# Patient Record
Sex: FEMALE | Race: WHITE | NOT HISPANIC OR LATINO | Employment: FULL TIME | URBAN - NONMETROPOLITAN AREA
[De-identification: names, ages, dates, MRNs, and addresses within clinical notes are randomized per-mention and may not be internally consistent; named-entity substitution may affect disease eponyms.]

---

## 2018-09-10 ENCOUNTER — HISTORICAL (OUTPATIENT)
Dept: ADMINISTRATIVE | Facility: HOSPITAL | Age: 58
End: 2018-09-10

## 2019-03-11 ENCOUNTER — HISTORICAL (OUTPATIENT)
Dept: ADMINISTRATIVE | Facility: HOSPITAL | Age: 59
End: 2019-03-11

## 2019-10-18 ENCOUNTER — HISTORICAL (OUTPATIENT)
Dept: ADMINISTRATIVE | Facility: HOSPITAL | Age: 59
End: 2019-10-18

## 2021-02-04 ENCOUNTER — HISTORICAL (OUTPATIENT)
Dept: RADIOLOGY | Facility: HOSPITAL | Age: 61
End: 2021-02-04

## 2022-04-10 ENCOUNTER — HISTORICAL (OUTPATIENT)
Dept: ADMINISTRATIVE | Facility: HOSPITAL | Age: 62
End: 2022-04-10

## 2022-04-28 VITALS
BODY MASS INDEX: 21.94 KG/M2 | HEIGHT: 58 IN | DIASTOLIC BLOOD PRESSURE: 68 MMHG | WEIGHT: 104.5 LBS | SYSTOLIC BLOOD PRESSURE: 110 MMHG

## 2022-08-23 ENCOUNTER — HISTORICAL (OUTPATIENT)
Dept: ADMINISTRATIVE | Facility: HOSPITAL | Age: 62
End: 2022-08-23

## 2023-03-30 ENCOUNTER — TELEPHONE (OUTPATIENT)
Dept: GASTROENTEROLOGY | Facility: CLINIC | Age: 63
End: 2023-03-30
Payer: MEDICAID

## 2023-03-30 NOTE — TELEPHONE ENCOUNTER
----- Message from Rmeedios Overton LPN sent at 3/30/2023 12:19 PM CDT -----  Regarding: FW: Colonosscopy  Contact: patient    ----- Message -----  From: Ellen Mcclelland  Sent: 3/30/2023  10:44 AM CDT  To: [facility] Staff  Subject: Colonosscopy                                     Per phone call with patient, she stated that she wants to schedule and appointment for a colonoscopy to be done.  Please return call at [phone] (home).    Thanks,  SJ

## 2023-04-04 ENCOUNTER — TELEPHONE (OUTPATIENT)
Dept: GASTROENTEROLOGY | Facility: CLINIC | Age: 63
End: 2023-04-04
Payer: MEDICAID

## 2023-04-04 NOTE — TELEPHONE ENCOUNTER
----- Message from Yamini Zepeda LPN sent at 4/3/2023  3:37 PM CDT -----  Contact: Chela    ----- Message -----  From: Rhiannon Bartlett  Sent: 4/3/2023   2:08 PM CDT  To: Josué [facility] Staff    Type:  Patient Returning Call    Who Called: Chela  Who Left Message for Patient: Lexii Meier  Does the patient know what this is regarding?: Appointment   Would the patient rather a call back or a response via [facility]? Call back   Best Call Back Number: please call her at [phone]  Additional Information:

## 2023-04-18 ENCOUNTER — OFFICE VISIT (OUTPATIENT)
Dept: GASTROENTEROLOGY | Facility: CLINIC | Age: 63
End: 2023-04-18
Payer: MEDICAID

## 2023-04-18 VITALS
SYSTOLIC BLOOD PRESSURE: 124 MMHG | HEART RATE: 73 BPM | WEIGHT: 107.19 LBS | BODY MASS INDEX: 22.5 KG/M2 | DIASTOLIC BLOOD PRESSURE: 80 MMHG | OXYGEN SATURATION: 97 % | HEIGHT: 58 IN

## 2023-04-18 DIAGNOSIS — Z12.11 SCREENING FOR COLON CANCER: ICD-10-CM

## 2023-04-18 DIAGNOSIS — K64.9 HEMORRHOIDS, UNSPECIFIED HEMORRHOID TYPE: Primary | ICD-10-CM

## 2023-04-18 PROCEDURE — 3079F DIAST BP 80-89 MM HG: CPT | Mod: CPTII,S$GLB,,

## 2023-04-18 PROCEDURE — 1159F MED LIST DOCD IN RCRD: CPT | Mod: CPTII,S$GLB,,

## 2023-04-18 PROCEDURE — 3074F SYST BP LT 130 MM HG: CPT | Mod: CPTII,S$GLB,,

## 2023-04-18 PROCEDURE — 99202 OFFICE O/P NEW SF 15 MIN: CPT | Mod: S$GLB,,,

## 2023-04-18 PROCEDURE — 3079F PR MOST RECENT DIASTOLIC BLOOD PRESSURE 80-89 MM HG: ICD-10-PCS | Mod: CPTII,S$GLB,,

## 2023-04-18 PROCEDURE — 1160F RVW MEDS BY RX/DR IN RCRD: CPT | Mod: CPTII,S$GLB,,

## 2023-04-18 PROCEDURE — 99202 PR OFFICE/OUTPT VISIT, NEW, LEVL II, 15-29 MIN: ICD-10-PCS | Mod: S$GLB,,,

## 2023-04-18 PROCEDURE — 3008F PR BODY MASS INDEX (BMI) DOCUMENTED: ICD-10-PCS | Mod: CPTII,S$GLB,,

## 2023-04-18 PROCEDURE — 3074F PR MOST RECENT SYSTOLIC BLOOD PRESSURE < 130 MM HG: ICD-10-PCS | Mod: CPTII,S$GLB,,

## 2023-04-18 PROCEDURE — 1159F PR MEDICATION LIST DOCUMENTED IN MEDICAL RECORD: ICD-10-PCS | Mod: CPTII,S$GLB,,

## 2023-04-18 PROCEDURE — 3008F BODY MASS INDEX DOCD: CPT | Mod: CPTII,S$GLB,,

## 2023-04-18 PROCEDURE — 1160F PR REVIEW ALL MEDS BY PRESCRIBER/CLIN PHARMACIST DOCUMENTED: ICD-10-PCS | Mod: CPTII,S$GLB,,

## 2023-04-18 RX ORDER — CONJUGATED ESTROGENS 0.62 MG/G
CREAM VAGINAL
COMMUNITY
Start: 2023-02-02 | End: 2023-05-26 | Stop reason: SDUPTHER

## 2023-04-18 RX ORDER — SOD SULF/POT CHLORIDE/MAG SULF 1.479 G
12 TABLET ORAL DAILY
Qty: 24 TABLET | Refills: 0 | Status: SHIPPED | OUTPATIENT
Start: 2023-04-18 | End: 2023-09-11

## 2023-04-18 RX ORDER — ESTRADIOL 0.5 MG/1
0.5 TABLET ORAL
COMMUNITY
Start: 2023-03-27 | End: 2023-09-11

## 2023-04-18 RX ORDER — VALACYCLOVIR HYDROCHLORIDE 1 G/1
TABLET, FILM COATED ORAL
COMMUNITY

## 2023-04-18 RX ORDER — BETAMETHASONE DIPROPIONATE 0.5 MG/G
CREAM TOPICAL
COMMUNITY

## 2023-04-18 RX ORDER — CALCIUM CARBONATE 600 MG
TABLET ORAL
COMMUNITY

## 2023-04-18 NOTE — PROGRESS NOTES
Clinic Note    Reason for visit:  The primary encounter diagnosis was Hemorrhoids, unspecified hemorrhoid type. A diagnosis of Screening for colon cancer was also pertinent to this visit.    PCP: Hardeep Lozano Iii       HPI:  This is a 62 y.o. female who is here to establish care. Patient states she has seen Dr. Moses in the past and is due for a colonoscopy. Can't recall if had polyps. Patient denies any reflux, dysphagia, abdominal pain, constipation, diarrhea, or blood in stool. She states sometimes after a BM she has to wipe a lot. She takes Metamucil and doesn't have this issue.       Review of Systems   Constitutional:  Negative for chills, diaphoresis, fatigue, fever and unexpected weight change.   HENT:  Negative for mouth sores, nosebleeds, postnasal drip, sore throat, trouble swallowing and voice change.    Eyes:  Negative for pain, discharge and eye dryness.   Respiratory:  Negative for apnea, cough, choking, chest tightness, shortness of breath and wheezing.    Cardiovascular:  Negative for chest pain, palpitations, leg swelling and claudication.   Gastrointestinal:  Negative for abdominal distention, abdominal pain, anal bleeding, blood in stool, change in bowel habit, constipation, diarrhea, nausea, rectal pain, vomiting, reflux, fecal incontinence and change in bowel habit.   Genitourinary:  Negative for bladder incontinence, difficulty urinating, dysuria, flank pain, frequency and hematuria.   Musculoskeletal:  Negative for arthralgias, back pain, joint swelling and joint deformity.   Integumentary:  Negative for color change, rash and wound.   Allergic/Immunologic: Negative for environmental allergies and food allergies.   Neurological:  Negative for seizures, facial asymmetry, speech difficulty, weakness, headaches and memory loss.   Hematological:  Negative for adenopathy. Does not bruise/bleed easily.   Psychiatric/Behavioral:  Negative for agitation, behavioral problems, confusion,  "hallucinations and sleep disturbance.       Past Medical History:   Diagnosis Date    Hormone replacement therapy (HRT)      Past Surgical History:   Procedure Laterality Date     SECTION      x2    COLONOSCOPY      TOTAL ABDOMINAL HYSTERECTOMY      per [facility] provider history     Family History   Problem Relation Age of Onset    Diverticulitis Mother     Diverticulitis Father      Social History     Tobacco Use    Smoking status: Never    Smokeless tobacco: Never   Substance Use Topics    Alcohol use: Yes     Comment: social    Drug use: Never     Review of patient's allergies indicates:   Allergen Reactions    Meloxicam       Medication List with Changes/Refills   New Medications    SOD SULF-POT CHLORIDE-MAG SULF (SUTAB) 1.479-0.188- 0.225 GRAM TABLET    Take 12 tablets by mouth once daily. Take according to package instructions with indicated amount of water. No breakfast day before test. May substitute with Suprep, Clenpiq, Plenvu, Moviprep or GoLytely based on Rx plan and patient preference.   Current Medications    BETAMETHASONE DIPROPIONATE 0.05 % CREAM    betamethasone dipropionate 0.05 % topical cream    CALCIUM CARBONATE (OS-JESSIE) 600 MG CALCIUM (1,500 MG) TAB    calcium carbonate 600 mg calcium (1,500 mg) tablet    ESTRADIOL (ESTRACE) 0.5 MG TABLET    Take 0.5 mg by mouth.    MULTIVITAMIN WITH MINERALS TABLET    Complete Multivitamin tablet   Take 1 tablet every day by oral route.    PREMARIN VAGINAL CREAM    Insert 0.5 grams of cream in vagina once daily at bedtime for 14 days then twice weekly thereafter.    VALACYCLOVIR (VALTREX) 1000 MG TABLET    valacyclovir 1 gram tablet         Vital Signs:  /80   Pulse 73   Ht 4' 10" (1.473 m)   Wt 48.6 kg (107 lb 3.2 oz)   SpO2 97%   BMI 22.40 kg/m²        Physical Exam  Vitals reviewed.   Constitutional:       General: She is awake. She is not in acute distress.     Appearance: Normal appearance. She is well-developed. She is not " ill-appearing, toxic-appearing or diaphoretic.   HENT:      Head: Normocephalic and atraumatic.      Nose: Nose normal.      Mouth/Throat:      Mouth: Mucous membranes are moist.      Pharynx: Oropharynx is clear. No oropharyngeal exudate or posterior oropharyngeal erythema.   Eyes:      General: Lids are normal. Gaze aligned appropriately. No scleral icterus.        Right eye: No discharge.         Left eye: No discharge.      Extraocular Movements: Extraocular movements intact.      Conjunctiva/sclera: Conjunctivae normal.   Neck:      Trachea: Trachea normal.   Cardiovascular:      Rate and Rhythm: Normal rate and regular rhythm.      Pulses:           Radial pulses are 2+ on the right side and 2+ on the left side.   Pulmonary:      Effort: Pulmonary effort is normal. No respiratory distress.      Breath sounds: Normal breath sounds. No stridor. No wheezing or rhonchi.   Chest:      Chest wall: No tenderness.   Abdominal:      General: Bowel sounds are normal. There is no distension.      Palpations: Abdomen is soft. There is no fluid wave, hepatomegaly or mass.      Tenderness: There is no abdominal tenderness. There is no guarding or rebound.   Musculoskeletal:         General: No tenderness or deformity.      Cervical back: Full passive range of motion without pain and neck supple. No tenderness.      Right lower leg: No edema.      Left lower leg: No edema.   Lymphadenopathy:      Cervical: No cervical adenopathy.   Skin:     General: Skin is warm and dry.      Capillary Refill: Capillary refill takes less than 2 seconds.      Coloration: Skin is not cyanotic, jaundiced or pale.      Findings: No rash.   Neurological:      General: No focal deficit present.      Mental Status: She is alert and oriented to person, place, and time.      Cranial Nerves: No facial asymmetry.      Motor: No tremor.   Psychiatric:         Attention and Perception: Attention normal.         Mood and Affect: Mood and affect normal.          Speech: Speech normal.         Behavior: Behavior normal. Behavior is cooperative.          All of the data above and below has been reviewed by myself and any further interpretations will be reflected in the assessment and plan.   The data includes review of external notes, and independent interpretation of lab results, procedures, x-rays, and imaging reports.      Assessment:  Hemorrhoids, unspecified hemorrhoid type    Screening for colon cancer  -     Ambulatory Referral to External Surgery  -     sod sulf-pot chloride-mag sulf (SUTAB) 1.479-0.188- 0.225 gram tablet; Take 12 tablets by mouth once daily. Take according to package instructions with indicated amount of water. No breakfast day before test. May substitute with Suprep, Clenpiq, Plenvu, Moviprep or GoLytely based on Rx plan and patient preference.  Dispense: 24 tablet; Refill: 0         Recommendations:  Schedule colonoscopy with Dr. Moses at [facility] with sutab.    Risks, benefits, and alternatives of medical management, any associated procedures, and/or treatment discussed with the patient. Patient given opportunity to ask questions and voices understanding. Patient has elected to proceed with the recommended care modalities as discussed.    Follow up if symptoms worsen or fail to improve.    Order summary:  Orders Placed This Encounter   Procedures    Ambulatory Referral to External Surgery        Instructed patient to notify my office if they have not been contacted within two weeks after any procedures, submitting any samples (biopsies, blood, stool, urine, etc.) or after any imaging (X-ray, CT, MRI, etc.).      Shayna Kurtz NP    This document may have been created using a voice recognition transcribing system. Incorrect words or phrases may have been missed during proofreading. Please interpret accordingly or contact me for clarification.

## 2023-04-18 NOTE — PATIENT INSTRUCTIONS
Schedule colonoscopy with Dr. Moses.    Please notify my office if you have not been contacted within two weeks after any procedures, submitting any samples (biopsies, blood, stool, urine, etc.) or after any imaging (X-ray, CT, MRI, etc.).

## 2023-04-18 NOTE — LETTER
April 18, 2023        Hardeep Lozano III, MD  [address] [facility]  [place] [address]             [place] [facility]  [address] [place] [address]  Phone: [phone]  Fax: [phone]   Patient: Chela Gilbert   MR Number: [id number]   YOB: 1960   Date of Visit: 4/18/2023       Dear Dr. Lozano:    Thank you for referring Chela Gilbert to me for evaluation. Attached you will find relevant portions of my assessment and plan of care.    If you have questions, please do not hesitate to call me. I look forward to following Chela Gilbert along with you.    Sincerely,      Shayna Kurtz, KATEY            CC  No Recipients    Enclosure

## 2023-05-26 ENCOUNTER — TELEPHONE (OUTPATIENT)
Dept: OBSTETRICS AND GYNECOLOGY | Facility: CLINIC | Age: 63
End: 2023-05-26
Payer: MEDICAID

## 2023-05-26 DIAGNOSIS — N95.2 VAGINAL ATROPHY: Primary | ICD-10-CM

## 2023-05-26 RX ORDER — BETAMETHASONE VALERATE 0.1 %
LOTION (ML) TOPICAL
COMMUNITY
Start: 2023-05-22

## 2023-05-26 RX ORDER — CONJUGATED ESTROGENS 0.62 MG/G
0.5 CREAM VAGINAL
Qty: 30 G | Refills: 1 | Status: SHIPPED | OUTPATIENT
Start: 2023-05-29 | End: 2023-09-11 | Stop reason: SDUPTHER

## 2023-05-26 NOTE — TELEPHONE ENCOUNTER
Pt. Last Annual 8/22/2022.  Premarin Vag Cream approved per NIVIA Vickers NP and sent to pharmacy.

## 2023-05-26 NOTE — TELEPHONE ENCOUNTER
----- Message from Brylee Guillory sent at 5/26/2023 10:46 AM CDT -----  Regarding: Refil  Contact: Chela  Type:  RX Refill Request    Who Called: Chela  Refill or New Rx:Refill  RX Name and Strength:PREMARIN vaginal cream 0.5 grams  How is the patient currently taking it? (ex. 1XDay):1XDAY  Is this a 30 day or 90 day RX:  Preferred Pharmacy with phone number:[facility]  Local or Mail Order:Local  Ordering Provider:Mrs. Huerta  Would the patient rather a call back or a response via [facility]? Call Back  Best Call Back Number:[phone]  Additional Information:

## 2023-06-21 DIAGNOSIS — Z12.11 SCREENING FOR COLON CANCER: Primary | ICD-10-CM

## 2023-06-21 NOTE — PROGRESS NOTES
"[place] [facility]  [address] [place] [address]  Phone: [phone]  Fax: [phone]    History & Physical         Provider: Dr. Lázaro Moses    Patient Name: Chela Gilbert DOB (age):1960  62 y.o.           Gender: female   Phone: [phone]     Referring Physician: Hardeep Lozano Iii     Vital Signs:   Height - 4' 10"  Weight - 107 lbs  BMI -  22.4    Plan: Colonoscopy    Encounter Diagnosis   Name Primary?    Screening for colon cancer Yes           History:      Past Medical History:   Diagnosis Date    Hormone replacement therapy (HRT)       Past Surgical History:   Procedure Laterality Date     SECTION      x2    COLONOSCOPY      TOTAL ABDOMINAL HYSTERECTOMY      per [facility] provider history      Medication List with Changes/Refills   Current Medications    BETAMETHASONE DIPROPIONATE 0.05 % CREAM    betamethasone dipropionate 0.05 % topical cream    BETAMETHASONE VALERATE 0.1% (VALISONE) 0.1 % LOTN    SMARTSI sparingly Topical Twice Daily    CALCIUM CARBONATE (OS-JESSIE) 600 MG CALCIUM (1,500 MG) TAB    calcium carbonate 600 mg calcium (1,500 mg) tablet    ESTRADIOL (ESTRACE) 0.5 MG TABLET    Take 0.5 mg by mouth.    MULTIVITAMIN WITH MINERALS TABLET    Complete Multivitamin tablet   Take 1 tablet every day by oral route.    PREMARIN VAGINAL CREAM    Place 0.5 g vaginally twice a week.    SOD SULF-POT CHLORIDE-MAG SULF (SUTAB) 1.479-0.188- 0.225 GRAM TABLET    Take 12 tablets by mouth once daily. Take according to package instructions with indicated amount of water. No breakfast day before test. May substitute with Suprep, Clenpiq, Plenvu, Moviprep or GoLytely based on Rx plan and patient preference.    VALACYCLOVIR (VALTREX) 1000 MG TABLET    valacyclovir 1 gram tablet      Review of patient's allergies indicates:   Allergen Reactions    Meloxicam     Testosterone Rash    "   Family History   Problem Relation Age of Onset    Diverticulitis Mother     Diverticulitis Father       Social History     Tobacco Use    Smoking status: Never    Smokeless tobacco: Never   Substance Use Topics    Alcohol use: Yes     Comment: social    Drug use: Never        Physical Examination:     General Appearance:___________________________  HEENT: _____________________________________  Abdomen:____________________________________  Heart:________________________________________  Lungs:_______________________________________  Extremities:___________________________________  Skin:_________________________________________  Endocrine:____________________________________  Genitourinary:_________________________________  Neurological:__________________________________      Patient has been evaluated immediately prior to sedation and is medically cleared for endoscopy with IVCS as an ASA class: ______      Physician Signature: _________________________       Date: ________  Time: ________

## 2023-06-22 ENCOUNTER — OUTSIDE PLACE OF SERVICE (OUTPATIENT)
Dept: GASTROENTEROLOGY | Facility: CLINIC | Age: 63
End: 2023-06-22
Payer: MEDICAID

## 2023-06-22 ENCOUNTER — OUTSIDE PLACE OF SERVICE (OUTPATIENT)
Dept: GASTROENTEROLOGY | Facility: CLINIC | Age: 63
End: 2023-06-22

## 2023-06-22 LAB — CRC RECOMMENDATION EXT: NORMAL

## 2023-06-22 PROCEDURE — 45378 PR COLONOSCOPY,DIAGNOSTIC: ICD-10-PCS | Mod: ,,, | Performed by: INTERNAL MEDICINE

## 2023-06-22 PROCEDURE — 45378 DIAGNOSTIC COLONOSCOPY: CPT | Mod: ,,, | Performed by: INTERNAL MEDICINE

## 2023-07-14 ENCOUNTER — DOCUMENTATION ONLY (OUTPATIENT)
Dept: GASTROENTEROLOGY | Facility: CLINIC | Age: 63
End: 2023-07-14
Payer: MEDICAID

## 2023-09-11 ENCOUNTER — OFFICE VISIT (OUTPATIENT)
Dept: OBSTETRICS AND GYNECOLOGY | Facility: CLINIC | Age: 63
End: 2023-09-11
Payer: MEDICAID

## 2023-09-11 VITALS
HEIGHT: 58 IN | SYSTOLIC BLOOD PRESSURE: 126 MMHG | BODY MASS INDEX: 22.04 KG/M2 | TEMPERATURE: 97 F | WEIGHT: 105 LBS | DIASTOLIC BLOOD PRESSURE: 78 MMHG

## 2023-09-11 DIAGNOSIS — Z79.890 HORMONE REPLACEMENT THERAPY: ICD-10-CM

## 2023-09-11 DIAGNOSIS — Z01.411 ABNORMAL GYNECOLOGICAL EXAMINATION: ICD-10-CM

## 2023-09-11 DIAGNOSIS — N95.2 VAGINAL ATROPHY: ICD-10-CM

## 2023-09-11 DIAGNOSIS — N60.19 FIBROCYSTIC BREAST CHANGES, UNSPECIFIED LATERALITY: ICD-10-CM

## 2023-09-11 DIAGNOSIS — Z12.31 BREAST CANCER SCREENING BY MAMMOGRAM: ICD-10-CM

## 2023-09-11 PROCEDURE — 1160F PR REVIEW ALL MEDS BY PRESCRIBER/CLIN PHARMACIST DOCUMENTED: ICD-10-PCS | Mod: CPTII,,, | Performed by: NURSE PRACTITIONER

## 2023-09-11 PROCEDURE — 3078F DIAST BP <80 MM HG: CPT | Mod: CPTII,,, | Performed by: NURSE PRACTITIONER

## 2023-09-11 PROCEDURE — 1159F MED LIST DOCD IN RCRD: CPT | Mod: CPTII,,, | Performed by: NURSE PRACTITIONER

## 2023-09-11 PROCEDURE — 3074F SYST BP LT 130 MM HG: CPT | Mod: CPTII,,, | Performed by: NURSE PRACTITIONER

## 2023-09-11 PROCEDURE — 99396 PR PREVENTIVE VISIT,EST,40-64: ICD-10-PCS | Mod: ,,, | Performed by: NURSE PRACTITIONER

## 2023-09-11 PROCEDURE — 3008F BODY MASS INDEX DOCD: CPT | Mod: CPTII,,, | Performed by: NURSE PRACTITIONER

## 2023-09-11 PROCEDURE — 3078F PR MOST RECENT DIASTOLIC BLOOD PRESSURE < 80 MM HG: ICD-10-PCS | Mod: CPTII,,, | Performed by: NURSE PRACTITIONER

## 2023-09-11 PROCEDURE — 3074F PR MOST RECENT SYSTOLIC BLOOD PRESSURE < 130 MM HG: ICD-10-PCS | Mod: CPTII,,, | Performed by: NURSE PRACTITIONER

## 2023-09-11 PROCEDURE — 3008F PR BODY MASS INDEX (BMI) DOCUMENTED: ICD-10-PCS | Mod: CPTII,,, | Performed by: NURSE PRACTITIONER

## 2023-09-11 PROCEDURE — 1160F RVW MEDS BY RX/DR IN RCRD: CPT | Mod: CPTII,,, | Performed by: NURSE PRACTITIONER

## 2023-09-11 PROCEDURE — 1159F PR MEDICATION LIST DOCUMENTED IN MEDICAL RECORD: ICD-10-PCS | Mod: CPTII,,, | Performed by: NURSE PRACTITIONER

## 2023-09-11 PROCEDURE — 99396 PREV VISIT EST AGE 40-64: CPT | Mod: ,,, | Performed by: NURSE PRACTITIONER

## 2023-09-11 RX ORDER — CONJUGATED ESTROGENS 0.62 MG/G
0.5 CREAM VAGINAL
Qty: 30 G | Refills: 1 | Status: SHIPPED | OUTPATIENT
Start: 2023-09-11

## 2023-09-11 NOTE — PROGRESS NOTES
Chief Complaint:   Well Woman     History of Present Illness:  Chela Gilbert is a 62 y.o. year old  here for her annual exam status post hysterectomy. No vaginal bleeding following hysterectomy. Denies any health changes. Currently on estradiol 0.5mg and uses Premarin vaginal cream 2x/week.  Denies menopausal symptoms.    Denies any family history of female or colon cancers.      MENOPAUSAL:  Age at menarche: 14  Age at menopause: 40  Hysterectomy:  Yes,  CATRINA  Last pap: unsure per Pt  H/o abnl pap: no  Postcoital Bleeding: No  Sexually Active: yes   Dyspareunia: No  H/o STI: No   HRT: yes; Estradiol  MM22, Birads 2  H/o abnl MMG: no   Colonoscopy: 23      Review of Systems:  General/Constitutional: Chills denies. Fatigue/weakness denies. Fever denies. Night sweats denies. Hot flashes denies    Respiratory: Cough denies. Hemoptysis denies. SOB denies. Sputum production denies. Wheezing denies .   Cardiovascular: Chest pain denies. Dizziness denies. Palpitations denies. Swelling in hands/feet denies    Gastrointestinal: Abdominal pain denies. Blood in stool denies. Constipation denies. Diarrhea denies. Heartburn denies. Nausea denies. Vomiting denies    Genitourinary: Incontinence denies. Blood in urine denies. Frequent urination denies. Painful urination denies. Urinary urgency denies. Nocturia denies    Gynecologic: Irregular menses denies. Heavy bleeding denies. Painful menses denies. Vaginal discharge denies. Vaginal odor denies. Vaginal itching denies. Vaginal lesion denies. Pelvic pain denies. Decreased libido denies. Vulvar lesion denies. Prolapse of genital organs denies. Painful intercourse denies. Postcoital bleeding denies    Psychiatric: Depression denies. Anxiety denies     OB History    Para Term  AB Living   3 2 2 0 1 2   SAB IAB Ectopic Multiple Live Births   1 0 0 0 2      # 1 - Date: , Sex: None, Weight: None, GA: None, Delivery: None, Apgar1: None,  Apgar5: None, Living: None, Birth Comments: None    # 2 - Date: , Sex: None, Weight: 3.856 kg (8 lb 8 oz), GA: None, Delivery: , Low Transverse, Apgar1: None, Apgar5: None, Living: Living, Birth Comments: None    # 3 - Date: , Sex: Male, Weight: 3.856 kg (8 lb 8 oz), GA: None, Delivery: , Low Transverse, Apgar1: None, Apgar5: None, Living: Living, Birth Comments: None      Past Medical History:   Diagnosis Date    Hormone replacement therapy (HRT)        Current Outpatient Medications:     betamethasone dipropionate 0.05 % cream, betamethasone dipropionate 0.05 % topical cream, Disp: , Rfl:     betamethasone valerate 0.1% (VALISONE) 0.1 % Lotn, SMARTSI sparingly Topical Twice Daily, Disp: , Rfl:     calcium carbonate (OS-JESSIE) 600 mg calcium (1,500 mg) Tab, calcium carbonate 600 mg calcium (1,500 mg) tablet, Disp: , Rfl:     estradioL (ESTRACE) 0.5 MG tablet, Take 0.5 mg by mouth., Disp: , Rfl:     multivitamin with minerals tablet, Complete Multivitamin tablet  Take 1 tablet every day by oral route., Disp: , Rfl:     PREMARIN vaginal cream, Place 0.5 g vaginally twice a week., Disp: 30 g, Rfl: 1    valACYclovir (VALTREX) 1000 MG tablet, valacyclovir 1 gram tablet, Disp: , Rfl:     sod sulf-pot chloride-mag sulf (SUTAB) 1.479-0.188- 0.225 gram tablet, Take 12 tablets by mouth once daily. Take according to package instructions with indicated amount of water. No breakfast day before test. May substitute with Suprep, Clenpiq, Plenvu, Moviprep or GoLytely based on Rx plan and patient preference. (Patient not taking: Reported on 2023), Disp: 24 tablet, Rfl: 0    Review of patient's allergies indicates:   Allergen Reactions    Meloxicam     Testosterone Rash     Past Surgical History:   Procedure Laterality Date     SECTION      x2    COLONOSCOPY  2023    TOTAL ABDOMINAL HYSTERECTOMY      per [facility] provider history     Family History   Problem Relation Age of Onset     "Diverticulitis Father     Diverticulitis Mother     Breast cancer Neg Hx     Colon cancer Neg Hx     Ovarian cancer Neg Hx     Uterine cancer Neg Hx     Cervical cancer Neg Hx      Social History     Socioeconomic History    Marital status:    Tobacco Use    Smoking status: Never     Passive exposure: Never    Smokeless tobacco: Never   Substance and Sexual Activity    Alcohol use: Yes     Comment: social    Drug use: Never    Sexual activity: Yes     Partners: Male     Birth control/protection: None, See Surgical Hx       Physical Exam:  /78 (BP Location: Left arm, Patient Position: Sitting, BP Method: Medium (Manual))   Temp 97.3 °F (36.3 °C) (Temporal)   Ht 4' 10" (1.473 m)   Wt 47.6 kg (105 lb)   LMP  (LMP Unknown)   BMI 21.95 kg/m²     Chaperone: present.       General appearance: healthy, well-nourished and well-developed     Psychiatric: Orientation to time, place and person. Normal mood and affect and active, alert     Skin: Appearance: no rashes or lesions.     Neck:   Neck: supple, FROM, trachea midline. and no masses   Thyroid: no enlargement or nodules and non-tender.       Cardiovascular:   Auscultation: RRR and no murmur.   Peripheral Vascular: no varicosities, LLE edema, RLE edema, calf tenderness, and palpable cord and pedal pulses intact.     Lungs:   Respiratory effort: no intercostal retractions or accessory muscle usage.   Auscultation: no wheezing, rales/crackles, or rhonchi and clear to auscultation.     Breast:   Inspection/Palpation: no tenderness, discrete/distinct masses, skin changes, or abnormal secretions. Nipple appearance normal. +bilateral fibrocystic changes    Abdomen:   Auscultation/Inspection/Palpation: no hepatomegaly, splenomegaly, masses, tenderness or CVA tenderness and soft, non-distended bowel sounds preset.    Hernia: no palpable hernias.     Female Genitalia:    Vulva: no masses, tenderness or lesions    Bladder/Urethra: no urethral discharge or mass, " normal meatus, bladder non-distended.    Vagina: no tenderness, erythema, cystocele, rectocele, abnormal vaginal discharge or vesicle(s) or ulcers    Cuff intact, no masses, NT   Adnexa/Parametria: no parametrial tenderness or mass, no adnexal tenderness or ovarian mass.     Lymph Nodes:   Palpation: non tender submandibular nodes, axillary nodes, or inguinal nodes.     Rectal Exam:   Rectum: normal perianal skin.       Assessment/Plan:  1. Abnormal gynecological examination  No pap, s/p hyst, no h/o abnl pap   Advised patient if she notices any changes to her breasts, including a lump, mass, dimpling, discharge, rash or tenderness, to should contact us immediately   Healthy diet, exercise   MMG  Multivitamin   Seat belt   Sunscreen use   Safe sex/ STI education   Annual labs: w/ PCP, Dr. Lozano  C-scope: 2023, negative pr pt    2. Hormone replacement therapy  Educated    Discussed with patient increase risk in CVA, MI, TE event and breast cancer.  Advised to discontinue estradiol - will continue Premarin vaginal cream 0.5gms 2x/week  Advise to call office if any symptoms    3. Fibrocystic breast changes, unspecified laterality  Discussed recommendations of annual screening after age of 40 with mammogram and MRI for patients with lifetime risk greater than 25%       Explained that screening is not 100% reliable. Advised patient if she notices any changes to her breast including a lump, mass, dimpling, discharge, rash, or tenderness she should contact us immediately.       Recommend BSE monthly     4. Breast cancer screening by mammogram  -     Mammo Digital Screening Bilat; Future

## 2023-09-12 ENCOUNTER — HOSPITAL ENCOUNTER (OUTPATIENT)
Dept: RADIOLOGY | Facility: HOSPITAL | Age: 63
Discharge: HOME | End: 2023-09-12
Attending: NURSE PRACTITIONER
Payer: MEDICAID

## 2023-09-12 DIAGNOSIS — Z12.31 BREAST CANCER SCREENING BY MAMMOGRAM: ICD-10-CM

## 2023-09-12 PROCEDURE — 77067 SCR MAMMO BI INCL CAD: CPT | Mod: TC

## 2024-06-25 ENCOUNTER — TELEPHONE (OUTPATIENT)
Dept: OBSTETRICS AND GYNECOLOGY | Facility: CLINIC | Age: 64
End: 2024-06-25
Payer: MEDICAID

## 2024-06-25 DIAGNOSIS — N95.2 VAGINAL ATROPHY: ICD-10-CM

## 2024-06-25 RX ORDER — CONJUGATED ESTROGENS 0.62 MG/G
0.5 CREAM VAGINAL
Qty: 30 G | Refills: 2 | Status: SHIPPED | OUTPATIENT
Start: 2024-06-27

## 2024-06-25 NOTE — TELEPHONE ENCOUNTER
----- Message from Brylee Guillory sent at 6/25/2024  3:10 PM CDT -----  Regarding: Refill  Contact: Chela  Type:  RX Refill Request    Who Called:   Refill or New Rx:  RX Name and Strength:PREMARIN vaginal cream  How is the patient currently taking it? (ex. 1XDay):  Is this a 30 day or 90 day RX:  Preferred Pharmacy with phone number:  [facility]  [facility] [address] [address]  Phone: [phone] Fax: [phone]  Local or Mail Order:  Ordering Provider:  Would the patient rather a call back or a response via [facility]? Call back  Best Call Back Number:[phone]   Additional Information: pt is requesting a refill on her medication

## 2024-06-25 NOTE — TELEPHONE ENCOUNTER
Spoke with patient, notified RF Premarin vaginal cream sent to [facility] until Annual 9/17/24. Verbalized understanding.

## 2024-09-17 ENCOUNTER — OFFICE VISIT (OUTPATIENT)
Dept: OBSTETRICS AND GYNECOLOGY | Facility: CLINIC | Age: 64
End: 2024-09-17
Payer: MEDICAID

## 2024-09-17 VITALS
DIASTOLIC BLOOD PRESSURE: 68 MMHG | SYSTOLIC BLOOD PRESSURE: 126 MMHG | TEMPERATURE: 96 F | WEIGHT: 100.38 LBS | BODY MASS INDEX: 21.07 KG/M2 | HEIGHT: 58 IN

## 2024-09-17 DIAGNOSIS — N95.2 VAGINAL ATROPHY: ICD-10-CM

## 2024-09-17 DIAGNOSIS — Z01.411 ABNORMAL GYNECOLOGICAL EXAMINATION: Primary | ICD-10-CM

## 2024-09-17 DIAGNOSIS — Z12.31 ENCOUNTER FOR SCREENING MAMMOGRAM FOR BREAST CANCER: ICD-10-CM

## 2024-09-17 PROCEDURE — 3008F BODY MASS INDEX DOCD: CPT | Mod: CPTII,,, | Performed by: NURSE PRACTITIONER

## 2024-09-17 PROCEDURE — 1159F MED LIST DOCD IN RCRD: CPT | Mod: CPTII,,, | Performed by: NURSE PRACTITIONER

## 2024-09-17 PROCEDURE — 1160F RVW MEDS BY RX/DR IN RCRD: CPT | Mod: CPTII,,, | Performed by: NURSE PRACTITIONER

## 2024-09-17 PROCEDURE — 3078F DIAST BP <80 MM HG: CPT | Mod: CPTII,,, | Performed by: NURSE PRACTITIONER

## 2024-09-17 PROCEDURE — 99396 PREV VISIT EST AGE 40-64: CPT | Mod: ,,, | Performed by: NURSE PRACTITIONER

## 2024-09-17 PROCEDURE — 3074F SYST BP LT 130 MM HG: CPT | Mod: CPTII,,, | Performed by: NURSE PRACTITIONER

## 2024-09-17 RX ORDER — CONJUGATED ESTROGENS 0.62 MG/G
CREAM VAGINAL
Qty: 30 G | Refills: 6 | Status: SHIPPED | OUTPATIENT
Start: 2024-09-19

## 2024-09-17 RX ORDER — CONJUGATED ESTROGENS 0.62 MG/G
0.5 CREAM VAGINAL
Qty: 30 G | Refills: 2 | Status: SHIPPED | OUTPATIENT
Start: 2024-09-19 | End: 2024-09-17

## 2024-09-17 RX ORDER — TRAZODONE HYDROCHLORIDE 50 MG/1
1 TABLET ORAL NIGHTLY
COMMUNITY

## 2024-09-17 NOTE — PROGRESS NOTES
Chief Complaint: Annual exam    Chief Complaint   Patient presents with    Well Woman     Annual gyn exam       HPI:   Chela Gilbert is a 63 y.o. year old  s/p CATRINA here for her Annual Exam. Using Premarin cream as directed. Requesting refill.  C/o possibly bug bite to right shoulder area, reports was at [employer] camp this weekend.  Reports minimal itching to area, denies drainage.     Gyn History:    Menstrual History  Cycle: No  Menarche Age: 14 years  No Cycle Reason: (!) Surgical  Surgical Reason: hysterectomy    Menopause  Menopause Age: 1 years  Post Menopausal Bleeding: No  Hormone Replacement Therapy: Yes (Premarin vag cream)    Pap History  Last pap date:  (pt do not remember when)  HPV Vaccine Completed: No    [place]  Sexually Active: Yes  Sexual Orientation: heterosexual  Postcoital Bleeding: No  Dyspareunia: No  STI History: No    Breast History  Last Breast Imaging Date: Yes  Date: 23  History of Abnormal Breast Imaging : No  History of Breast Biopsy: No          Past Medical History:   Diagnosis Date    Hormone replacement therapy (HRT)      Past Surgical History:   Procedure Laterality Date     SECTION      x2    COLONOSCOPY  2023    TOTAL ABDOMINAL HYSTERECTOMY      per [facility] provider history       Current Outpatient Medications:     multivitamin with minerals tablet, Complete Multivitamin tablet  Take 1 tablet every day by oral route., Disp: , Rfl:     PREMARIN vaginal cream, Place 0.5 g vaginally twice a week., Disp: 30 g, Rfl: 2    traZODone (DESYREL) 50 MG tablet, Take 1 tablet by mouth every evening., Disp: , Rfl:     betamethasone dipropionate 0.05 % cream, betamethasone dipropionate 0.05 % topical cream (Patient not taking: Reported on 2024), Disp: , Rfl:     betamethasone valerate 0.1% (VALISONE) 0.1 % Lotn, SMARTSI sparingly Topical Twice Daily (Patient not taking: Reported on 2024), Disp: , Rfl:     calcium carbonate (OS-JESSIE) 600 mg  calcium (1,500 mg) Tab, calcium carbonate 600 mg calcium (1,500 mg) tablet (Patient not taking: Reported on 2024), Disp: , Rfl:     valACYclovir (VALTREX) 1000 MG tablet, valacyclovir 1 gram tablet (Patient not taking: Reported on 2024), Disp: , Rfl:   Review of patient's allergies indicates:   Allergen Reactions    Meloxicam     Testosterone Rash     OB History    Para Term  AB Living   3 2 2   1 2   SAB IAB Ectopic Multiple Live Births   1       2      # Outcome Date GA Lbr Chaz/2nd Weight Sex Type Anes PTL Lv   3 Term    3.856 kg (8 lb 8 oz) M CS-LTranv EPI  CHICHI   2 Term    3.856 kg (8 lb 8 oz)  CS-LTranv EPI  CHICHI   1 SAB              Social History     Tobacco Use    Smoking status: Never     Passive exposure: Never    Smokeless tobacco: Never   Substance Use Topics    Alcohol use: Yes     Comment: social    Drug use: Never     Family History   Problem Relation Name Age of Onset    Diverticulitis Father      Diverticulitis Mother      Breast cancer Neg Hx      Colon cancer Neg Hx      Ovarian cancer Neg Hx      Uterine cancer Neg Hx      Cervical cancer Neg Hx         Review of Systems:   Review of Systems   Constitutional:  Negative for appetite change, chills, fatigue, fever and unexpected weight change.   Eyes:  Negative for visual disturbance.   Respiratory:  Negative for cough, shortness of breath and wheezing.    Cardiovascular:  Negative for chest pain, palpitations and leg swelling.   Gastrointestinal:  Negative for abdominal pain, bloating, blood in stool, constipation, diarrhea, nausea, vomiting, reflux and fecal incontinence.   Endocrine: Negative for hair loss and hot flashes.   Genitourinary:  Negative for bladder incontinence, decreased libido, dysmenorrhea, dyspareunia, dysuria, flank pain, frequency, genital sores, hematuria, hot flashes, menorrhagia, menstrual problem, pelvic pain, urgency, vaginal bleeding, vaginal discharge, vaginal pain, urinary  "incontinence, postcoital bleeding, postmenopausal bleeding, vaginal dryness and vaginal odor.   Integumentary:  Negative for rash, acne, hair changes, breast mass, nipple discharge, breast skin changes and breast tenderness.   Neurological:  Negative for headaches.   Psychiatric/Behavioral:  Negative for depression.    Breast: Negative for asymmetry, breast self exam, lump, mass, mastodynia, nipple discharge, skin changes and tenderness       Physical Exam:  /68 (BP Location: Right arm, Patient Position: Sitting, BP Method: Medium (Manual))   Temp 96.1 °F (35.6 °C) (Temporal)   Ht 4' 10" (1.473 m)   Wt 45.5 kg (100 lb 6.4 oz)   LMP  (LMP Unknown)   BMI 20.98 kg/m²       Physical Exam:   Constitutional: She is oriented to person, place, and time. She appears well-developed and well-nourished.    HENT:   Head: Normocephalic.      Cardiovascular:       Exam reveals no edema.        Pulmonary/Chest: Effort normal. She exhibits no mass, no tenderness, no bony tenderness, no deformity and no retraction. Right breast exhibits no inverted nipple, no mass, no nipple discharge, no skin change, no tenderness, no bleeding, no swelling, no mastectomy, no augmentation and no lumpectomy. Left breast exhibits no inverted nipple, no mass, no nipple discharge, no skin change, no tenderness, no bleeding, no swelling, no mastectomy, no augmentation and no lumpectomy. Breasts are symmetrical.        Abdominal: Soft. She exhibits no distension and no mass. There is no abdominal tenderness. There is no rebound and no guarding. No hernia. Hernia confirmed negative in the right inguinal area.     Genitourinary:    Inguinal canal, right adnexa, left adnexa and rectum normal.   Rectum:      No anal fissure or external hemorrhoid.   The external female genitalia was normal.   No external genitalia lesions identified,Genitalia hair distrobution normal .     Labial bartholins normal.There is no rash, tenderness, lesion or injury on " the right labia. There is no rash, tenderness, lesion or injury on the left labia. No no masses or organomegaly. Right adnexum displays no mass, no tenderness and no fullness. Left adnexum displays no mass, no tenderness and no fullness. Vagina exhibits no lesion. No erythema, vaginal discharge, tenderness, bleeding, rectocele, cystocele or prolapse of vaginal walls in the vagina.    No foreign body in the vagina.      No signs of injury in the vagina.   Vagina was moist.Cervix is absent.Uterus is absent. Normal urethral meatus.Urethral Meatus exhibits: no urethral lesionUrethra findings: no urethral mass, no tenderness and no prolapsedBladder findings: no bladder tenderness          Musculoskeletal: Normal range of motion.      Lymphadenopathy: No inguinal adenopathy noted on the right or left side.    Neurological: She is alert and oriented to person, place, and time.    Skin: Skin is warm and dry.    Psychiatric: She has a normal mood and affect. Her behavior is normal. Judgment and thought content normal.        Assessment:   Annual Well Women Exam  1. Encounter for screening mammogram for breast cancer  - Mammo Digital Screening Bilat w/ [facility]; Future    2. Vaginal atrophy    3. Abnormal gynecological examination        Plan:    Breast Self-awareness  Recommend annual mammogram  Recommend exercise at least 3 times weekly  Healthy, balanced diet  Keep yearly follow up with PCP  No follow-ups on file.   Chela was seen today for well woman.    Diagnoses and all orders for this visit:    Abnormal gynecological examination    Encounter for screening mammogram for breast cancer  -     Mammo Digital Screening Bilat w/ [facility]; Future    Vaginal atrophy  The following orders have not been finalized:  -     PREMARIN vaginal cream      Refill premarin cream 2-3x/week    RTC PRN/ANNUAL     Counseling:    A brief discussion of STD prevention was had.    Avoidance of cigarette smoking, alcohol use, and drug use was  encouraged.    A healthy diet and regular exercise was stressed.    All questions were answered and the patient voiced understanding of the above issues.      Advised if redness to right shoulder increases, should f/u with PCP for evaluation    This note was transcribed by Cherelle Camp. There may be transcription errors as a result, however minimal. Effort has been made to ensure accuracy of transcription, but any obvious errors or omissions should be clarified with the author of the document.       I agree with the above documentation.

## 2024-10-21 ENCOUNTER — HOSPITAL ENCOUNTER (OUTPATIENT)
Dept: RADIOLOGY | Facility: HOSPITAL | Age: 64
Discharge: HOME | End: 2024-10-21
Attending: NURSE PRACTITIONER
Payer: MEDICAID

## 2024-10-21 DIAGNOSIS — Z12.31 ENCOUNTER FOR SCREENING MAMMOGRAM FOR BREAST CANCER: ICD-10-CM

## 2024-10-21 PROCEDURE — 77063 BREAST TOMOSYNTHESIS BI: CPT | Mod: TC

## 2024-10-21 PROCEDURE — 77067 SCR MAMMO BI INCL CAD: CPT | Mod: TC

## 2025-08-12 ENCOUNTER — OFFICE VISIT (OUTPATIENT)
Dept: OBSTETRICS AND GYNECOLOGY | Facility: CLINIC | Age: 65
End: 2025-08-12
Payer: MEDICAID

## 2025-08-12 VITALS
HEIGHT: 58 IN | BODY MASS INDEX: 20.23 KG/M2 | WEIGHT: 96.38 LBS | DIASTOLIC BLOOD PRESSURE: 80 MMHG | SYSTOLIC BLOOD PRESSURE: 124 MMHG

## 2025-08-12 DIAGNOSIS — N32.89 BLADDER SPASMS: ICD-10-CM

## 2025-08-12 DIAGNOSIS — M54.50 LOW BACK PAIN, UNSPECIFIED BACK PAIN LATERALITY, UNSPECIFIED CHRONICITY, UNSPECIFIED WHETHER SCIATICA PRESENT: Primary | ICD-10-CM

## 2025-08-12 LAB
BILIRUB UR QL STRIP: NEGATIVE
GLUCOSE UR QL STRIP: NEGATIVE
KETONES UR QL STRIP: NEGATIVE
LEUKOCYTE ESTERASE UR QL STRIP: NEGATIVE
PH, POC UA: 6
POC BLOOD, URINE: NEGATIVE
POC NITRATES, URINE: NEGATIVE
PROT UR QL STRIP: NEGATIVE
SP GR UR STRIP: 1.01 (ref 1–1.03)
UROBILINOGEN UR STRIP-ACNC: 0.2 (ref 0.1–1.1)

## 2025-08-12 PROCEDURE — 1159F MED LIST DOCD IN RCRD: CPT | Mod: CPTII,,, | Performed by: NURSE PRACTITIONER

## 2025-08-12 PROCEDURE — 99212 OFFICE O/P EST SF 10 MIN: CPT | Mod: ,,, | Performed by: NURSE PRACTITIONER

## 2025-08-12 PROCEDURE — 3079F DIAST BP 80-89 MM HG: CPT | Mod: CPTII,,, | Performed by: NURSE PRACTITIONER

## 2025-08-12 PROCEDURE — 1160F RVW MEDS BY RX/DR IN RCRD: CPT | Mod: CPTII,,, | Performed by: NURSE PRACTITIONER

## 2025-08-12 PROCEDURE — 3008F BODY MASS INDEX DOCD: CPT | Mod: CPTII,,, | Performed by: NURSE PRACTITIONER

## 2025-08-12 PROCEDURE — 3074F SYST BP LT 130 MM HG: CPT | Mod: CPTII,,, | Performed by: NURSE PRACTITIONER

## 2025-08-12 PROCEDURE — 81003 URINALYSIS AUTO W/O SCOPE: CPT | Mod: QW,,, | Performed by: NURSE PRACTITIONER

## 2025-08-12 RX ORDER — FLUTICASONE PROPIONATE 50 MCG
2 SPRAY, SUSPENSION (ML) NASAL
COMMUNITY
Start: 2025-08-05

## 2025-08-12 RX ORDER — PHENAZOPYRIDINE HYDROCHLORIDE 200 MG/1
200 TABLET, FILM COATED ORAL 3 TIMES DAILY PRN
Qty: 20 TABLET | Refills: 0 | Status: SHIPPED | OUTPATIENT
Start: 2025-08-12 | End: 2026-08-12

## 2025-08-12 RX ORDER — ESTRADIOL 0.5 MG/1
0.5 TABLET ORAL NIGHTLY
COMMUNITY
Start: 2025-08-05

## 2025-08-12 RX ORDER — MONTELUKAST SODIUM 10 MG/1
10 TABLET ORAL DAILY PRN
COMMUNITY
Start: 2025-08-08

## 2025-09-03 ENCOUNTER — HOSPITAL ENCOUNTER (OUTPATIENT)
Dept: RADIOLOGY | Facility: HOSPITAL | Age: 65
Discharge: HOME | End: 2025-09-03
Attending: FAMILY MEDICINE
Payer: MEDICAID

## 2025-09-03 DIAGNOSIS — M54.50 LUMBAGO: ICD-10-CM

## 2025-09-03 PROCEDURE — 72100 X-RAY EXAM L-S SPINE 2/3 VWS: CPT | Mod: TC
